# Patient Record
Sex: FEMALE | Race: WHITE
[De-identification: names, ages, dates, MRNs, and addresses within clinical notes are randomized per-mention and may not be internally consistent; named-entity substitution may affect disease eponyms.]

---

## 2020-11-07 ENCOUNTER — HOSPITAL ENCOUNTER (EMERGENCY)
Dept: HOSPITAL 75 - ER | Age: 25
Discharge: HOME | End: 2020-11-07
Payer: SELF-PAY

## 2020-11-07 VITALS — DIASTOLIC BLOOD PRESSURE: 99 MMHG | SYSTOLIC BLOOD PRESSURE: 186 MMHG

## 2020-11-07 VITALS — WEIGHT: 293 LBS | BODY MASS INDEX: 44.41 KG/M2 | HEIGHT: 67.99 IN

## 2020-11-07 DIAGNOSIS — E11.9: ICD-10-CM

## 2020-11-07 DIAGNOSIS — Z87.891: ICD-10-CM

## 2020-11-07 DIAGNOSIS — Z80.9: ICD-10-CM

## 2020-11-07 DIAGNOSIS — Z88.1: ICD-10-CM

## 2020-11-07 DIAGNOSIS — Z82.49: ICD-10-CM

## 2020-11-07 DIAGNOSIS — R10.32: Primary | ICD-10-CM

## 2020-11-07 DIAGNOSIS — Z79.84: ICD-10-CM

## 2020-11-07 DIAGNOSIS — Z83.3: ICD-10-CM

## 2020-11-07 DIAGNOSIS — N76.0: ICD-10-CM

## 2020-11-07 DIAGNOSIS — E66.9: ICD-10-CM

## 2020-11-07 LAB
ALBUMIN SERPL-MCNC: 4.1 GM/DL (ref 3.2–4.5)
ALP SERPL-CCNC: 44 U/L (ref 40–136)
ALT SERPL-CCNC: 59 U/L (ref 0–55)
APTT PPP: YELLOW S
BACTERIA #/AREA URNS HPF: (no result) /HPF
BASOPHILS # BLD AUTO: 0 10^3/UL (ref 0–0.1)
BASOPHILS NFR BLD AUTO: 1 % (ref 0–10)
BILIRUB SERPL-MCNC: 0.4 MG/DL (ref 0.1–1)
BILIRUB UR QL STRIP: NEGATIVE
BUN/CREAT SERPL: 15
CALCIUM SERPL-MCNC: 8.9 MG/DL (ref 8.5–10.1)
CHLORIDE SERPL-SCNC: 107 MMOL/L (ref 98–107)
CO2 SERPL-SCNC: 20 MMOL/L (ref 21–32)
CREAT SERPL-MCNC: 0.65 MG/DL (ref 0.6–1.3)
EOSINOPHIL # BLD AUTO: 0.2 10^3/UL (ref 0–0.3)
EOSINOPHIL NFR BLD AUTO: 3 % (ref 0–10)
FIBRINOGEN PPP-MCNC: CLEAR MG/DL
GFR SERPLBLD BASED ON 1.73 SQ M-ARVRAT: > 60 ML/MIN
GLUCOSE SERPL-MCNC: 103 MG/DL (ref 70–105)
GLUCOSE UR STRIP-MCNC: NEGATIVE MG/DL
HCT VFR BLD CALC: 43 % (ref 35–52)
HGB BLD-MCNC: 14.4 G/DL (ref 11.5–16)
KETONES UR QL STRIP: NEGATIVE
LEUKOCYTE ESTERASE UR QL STRIP: NEGATIVE
LYMPHOCYTES # BLD AUTO: 1.5 10^3/UL (ref 1–4)
LYMPHOCYTES NFR BLD AUTO: 27 % (ref 12–44)
MANUAL DIFFERENTIAL PERFORMED BLD QL: NO
MCH RBC QN AUTO: 29 PG (ref 25–34)
MCHC RBC AUTO-ENTMCNC: 34 G/DL (ref 32–36)
MCV RBC AUTO: 86 FL (ref 80–99)
MONOCYTES # BLD AUTO: 0.5 10^3/UL (ref 0–1)
MONOCYTES NFR BLD AUTO: 8 % (ref 0–12)
NEUTROPHILS # BLD AUTO: 3.5 10^3/UL (ref 1.8–7.8)
NEUTROPHILS NFR BLD AUTO: 61 % (ref 42–75)
NITRITE UR QL STRIP: NEGATIVE
PH UR STRIP: 6 [PH] (ref 5–9)
PLATELET # BLD: 209 10^3/UL (ref 130–400)
PMV BLD AUTO: 10.6 FL (ref 9–12.2)
POTASSIUM SERPL-SCNC: 4.4 MMOL/L (ref 3.6–5)
PROT SERPL-MCNC: 7.2 GM/DL (ref 6.4–8.2)
PROT UR QL STRIP: NEGATIVE
RBC #/AREA URNS HPF: (no result) /HPF
SODIUM SERPL-SCNC: 138 MMOL/L (ref 135–145)
SP GR UR STRIP: 1.02 (ref 1.02–1.02)
SQUAMOUS #/AREA URNS HPF: (no result) /HPF
WBC # BLD AUTO: 5.7 10^3/UL (ref 4.3–11)
WBC #/AREA URNS HPF: (no result) /HPF

## 2020-11-07 PROCEDURE — 85025 COMPLETE CBC W/AUTO DIFF WBC: CPT

## 2020-11-07 PROCEDURE — 86141 C-REACTIVE PROTEIN HS: CPT

## 2020-11-07 PROCEDURE — 87591 N.GONORRHOEAE DNA AMP PROB: CPT

## 2020-11-07 PROCEDURE — 36415 COLL VENOUS BLD VENIPUNCTURE: CPT

## 2020-11-07 PROCEDURE — 84703 CHORIONIC GONADOTROPIN ASSAY: CPT

## 2020-11-07 PROCEDURE — 87491 CHLMYD TRACH DNA AMP PROBE: CPT

## 2020-11-07 PROCEDURE — 87210 SMEAR WET MOUNT SALINE/INK: CPT

## 2020-11-07 PROCEDURE — 80053 COMPREHEN METABOLIC PANEL: CPT

## 2020-11-07 PROCEDURE — 74177 CT ABD & PELVIS W/CONTRAST: CPT

## 2020-11-07 PROCEDURE — 81000 URINALYSIS NONAUTO W/SCOPE: CPT

## 2020-11-07 PROCEDURE — 84702 CHORIONIC GONADOTROPIN TEST: CPT

## 2020-11-07 NOTE — NUR
ON ADMIT  PATIENT  STATES SHE WOULD KEEP IN CONTACT WITH .  JUST 
CALLED ASKING FOR UPDATE WENT TO ROOM  PATIENT REPORTED HER PHONE IS NOW DEAD 
AND CAN'T  CONACT HIM PHONE GIVEN TO CALL

## 2020-11-07 NOTE — NUR
PATIENT C/O IV BURNING  FLUSED WITHOUT PROBLEM AFTER FLUSED PATIENT REPORTS 
THAT IT IS NOT BURNING NOW.

## 2020-11-07 NOTE — DIAGNOSTIC IMAGING REPORT
CT ABDOMEN/PELVIS W



TECHNIQUE: Multiple contiguous axial images were obtained through

the abdomen and pelvis after administration of intravenous

contrast. All CT scans use one or more of the following dose

optimizing techniques: automated exposure control, MA and/or KvP

adjustment based on a patient size and exam type, or iterative

reconstruction. 



INDICATION: Lower abdominal pain. Pelvic discharge.



COMPARISON: CT abdomen and pelvis of 11/06/2016



FINDINGS:



Lower chest: The lung bases are clear. No pericardial or pleural

effusion. 



Peritoneum: No free intraperitoneal air or fluid.  



Liver and biliary system: Diffuse hypoattenuation liver suggests

hepatic steatosis. No focal hepatic lesion. The gallbladder is

normal. No biliary duct dilation. 



Spleen and Pancreas: Spleen is normal.  The pancreas enhances

normally without mass lesion or peripancreatic inflammatory

changes. 



Adrenals: Normal.



 tract: The kidneys enhance normally without suspicious mass or

obstruction. Urinary bladder is distended without wall

thickening.  Uterus and ovaries are physiologic in appearance. No

dilation of the fallopian tubes suggest pelvic inflammatory

disease.



GI tract: Stomach is decompressed.  No bowel obstruction.  No

pericolonic inflammatory changes.  Normal appendix.



Vasculature and Lymph nodes: Normal caliber aorta. No abdominal

or pelvic lymphadenopathy.



Musculoskeletal: No concerning osseous lesion. 



IMPRESSION: 

1. No acute inflammatory or obstructive process.

2. Mild hepatic steatosis.



Dictated by: 



  Dictated on workstation # [id number]

## 2020-11-07 NOTE — ED ABDOMINAL PAIN
General


Stated Complaint:  ABD PAIN


Source of Information:  Patient


Exam Limitations:  No Limitations





History of Present Illness


Date Seen by Provider:  2020


Time Seen by Provider:  08:55


Initial Comments


Here with report of lower abdominal pain and cramping to the back. She woke up 

with a gush of fluid this morning. States that she's had the pain intermittently

over the last 2 days. She has not taken anything for that. She has not had a 

menstrual period since January. She has taken home pregnancy test but those have

been negative. She is sexually active with her . Denies blood in her 

urine or stool. States fluid was clear. Denies fever, chills, sore throat, runny

nose, contact COVID-19 or other concerns. Pain is worse on the left lower and 

centrally and radiates to her back.


Timing/Duration:  1-2 Days


Severity/Quality:  Moderate, Aching, Cramping


Location:  LLQ, Suprapubic


Radiation:  Back


Activities at Onset:  None


Modifying Factors:  Improves With Resting


Associated Symptoms:  Back Pain; No Fever/Chills, No Nausea/Vomiting, No 

Shortness of Air, No Swelling/Mass in Abdomen, No Weakness





Allergies and Home Medications


Allergies


Coded Allergies:  


     amoxicillin (Verified  Allergy, Unknown, 16)


     clavulanic acid (Verified  Allergy, Unknown, 16)





Home Medications


Ketorolac Tromethamine 10 Mg Tablet, 10 MG PO Q6H


   Prescribed by: LILY KHALIL on 16


Levofloxacin 500 Mg Tablet, 500 MG PO DAILY


   Prescribed by: LILY KHALIL on 16


Metformin Hcl 500 Mg Tablet, 1 EACH PO BID WITH MEALS, (Reported)


Ondansetron 4 Mg Tab.rapdis, 4 MG PO Q4H


   Prescribed by: LILY KHALIL on 16





Patient Home Medication List


Home Medication List Reviewed:  Yes





Review of Systems


Review of Systems


Constitutional:  see HPI


EENTM:  No Symptoms Reported, See HPI


Respiratory:  Denies Cough, Denies Shortness of Air


Cardiovascular:  Denies Chest Pain, Denies Edema


Gastrointestinal:  See HPI


Genitourinary:  Denies Flank Pain, Denies Hematuria, Denies Pain


Musculoskeletal:  see HPI; No muscle pain





All Other Systems Reviewed


Negative Unless Noted:  Yes





Past Medical-Social-Family Hx


Past Med/Social Hx:  Reviewed Nursing Past Med/Soc Hx


Patient Social History


Alcohol Use:  Denies Use


Recreational Drug Use:  No


Smoking Status:  Former Smoker


Type Used:  Cigarettes


Recent Foreign Travel:  No


Contact w/Someone Who Travel:  No


Recent Hopitalizations:  No





Past Medical History


Surgeries:  Yes


Orthopedic


Respiratory:  No


Cardiac:  No


Neurological:  No


Pregnant:  No


Reproductive Disorders:  Yes


Female Reproductive Disorders:  Menstrual Problems


Genitourinary:  No


Gastrointestinal:  No


Musculoskeletal:  Yes


Fractures


Endocrine:  Yes


Diabetes, Non-Insulin dep





Family Medical History


Reviewed Nursing Family Hx


Cancer, Diabetes, Hypertension





Physical Exam


Vital Signs





Vital Signs - First Documented








 20





 08:54


 


Temp 36.1


 


Pulse 111


 


Resp 18


 


B/P (MAP) 178/117 (137)


 


Pulse Ox 97


 


O2 Delivery Room Air





Capillary Refill :


Height/Weight/BMI


Height: 5'8"


Weight: 320lbs. oz. 145.731776du;  BMI


Method:Stated


General Appearance:  WD/WN, no apparent distress, obese


HEENT:  PERRL/EOMI, pharynx normal


Neck:  full range of motion, supple


Respiratory:  lungs clear, normal breath sounds


Cardiovascular:  regular rate, rhythm, no murmur


Peripheral Pulses:  2+ Dorsalis Pedis (R), 2+ Left Dors-Pedis (L), 2+ Radial 

Pulses (R), 2+ Radial Pulses (L)


Gastrointestinal:  soft, tenderness (left lower quadrant)


Extremities:  non-tender, normal inspection


Back:  normal inspection, no CVA tenderness, no vertebral tenderness


Neurologic/Psychiatric:  alert, oriented x 3


Skin:  normal color, warm/dry





Progress/Results/Core Measures


Results/Orders


Lab Results





Laboratory Tests








Test


 20


09:04 20


09:15 Range/Units


 


 


Urine Color YELLOW    


 


Urine Clarity CLEAR    


 


Urine pH 6.0   5-9  


 


Urine Specific Gravity 1.025 H  1.016-1.022  


 


Urine Protein NEGATIVE   NEGATIVE  


 


Urine Glucose (UA) NEGATIVE   NEGATIVE  


 


Urine Ketones NEGATIVE   NEGATIVE  


 


Urine Nitrite NEGATIVE   NEGATIVE  


 


Urine Bilirubin NEGATIVE   NEGATIVE  


 


Urine Urobilinogen 0.2   < = 1.0  MG/DL


 


Urine Leukocyte Esterase NEGATIVE   NEGATIVE  


 


Urine RBC (Auto) NEGATIVE   NEGATIVE  


 


Urine RBC NONE    /HPF


 


Urine WBC 0-2    /HPF


 


Urine Squamous Epithelial


Cells 5-10 


 


  /HPF





 


Urine Crystals NONE    /LPF


 


Urine Bacteria FEW H   /HPF


 


Urine Casts NONE    /LPF


 


Urine Mucus NEGATIVE    /LPF


 


Urine Culture Indicated NO    


 


White Blood Count


 


 5.7 


 4.3-11.0


10^3/uL


 


Red Blood Count


 


 5.01 


 3.80-5.11


10^6/uL


 


Hemoglobin  14.4  11.5-16.0  g/dL


 


Hematocrit  43  35-52  %


 


Mean Corpuscular Volume  86  80-99  fL


 


Mean Corpuscular Hemoglobin  29  25-34  pg


 


Mean Corpuscular Hemoglobin


Concent 


 34 


 32-36  g/dL





 


Red Cell Distribution Width  13.7  10.0-14.5  %


 


Platelet Count


 


 209 


 130-400


10^3/uL


 


Mean Platelet Volume  10.6  9.0-12.2  fL


 


Immature Granulocyte % (Auto)  0   %


 


Neutrophils (%) (Auto)  61  42-75  %


 


Lymphocytes (%) (Auto)  27  12-44  %


 


Monocytes (%) (Auto)  8  0-12  %


 


Eosinophils (%) (Auto)  3  0-10  %


 


Basophils (%) (Auto)  1  0-10  %


 


Neutrophils # (Auto)


 


 3.5 


 1.8-7.8


10^3/uL


 


Lymphocytes # (Auto)


 


 1.5 


 1.0-4.0


10^3/uL


 


Monocytes # (Auto)


 


 0.5 


 0.0-1.0


10^3/uL


 


Eosinophils # (Auto)


 


 0.2 


 0.0-0.3


10^3/uL


 


Basophils # (Auto)


 


 0.0 


 0.0-0.1


10^3/uL


 


Immature Granulocyte # (Auto)


 


 0.0 


 0.0-0.1


10^3/uL


 


Sodium Level  138  135-145  MMOL/L


 


Potassium Level  4.4  3.6-5.0  MMOL/L


 


Chloride Level  107    MMOL/L


 


Carbon Dioxide Level  20 L 21-32  MMOL/L


 


Anion Gap  11  5-14  MMOL/L


 


Blood Urea Nitrogen  10  7-18  MG/DL


 


Creatinine


 


 0.65 


 0.60-1.30


MG/DL


 


Estimat Glomerular Filtration


Rate 


 > 60 


  





 


BUN/Creatinine Ratio  15   


 


Glucose Level  103    MG/DL


 


Calcium Level  8.9  8.5-10.1  MG/DL


 


Corrected Calcium  8.8  8.5-10.1  MG/DL


 


Total Bilirubin  0.4  0.1-1.0  MG/DL


 


Aspartate Amino Transf


(AST/SGOT) 


 29 


 5-34  U/L





 


Alanine Aminotransferase


(ALT/SGPT) 


 59 H


 0-55  U/L





 


Alkaline Phosphatase  44    U/L


 


C-Reactive Protein High


Sensitivity 


 0.87 H


 0.00-0.50


MG/DL


 


Total Protein  7.2  6.4-8.2  GM/DL


 


Albumin  4.1  3.2-4.5  GM/DL


 


Human Chorionic Gonadotropin,


Quant 


 < 5 


 <5  MIU/ML











Micro Results





Microbiology


20 Wet Prep - Final, Complete


          





My Orders





Orders - MITRA BOO MD


Cbc With Automated Diff (20 09:06)


Comprehensive Metabolic Panel (20 09:06)


Hs C Reactive Protein (20 09:06)


Ua Culture If Indicated (20 09:06)


Ed Iv/Invasive Line Start (20 09:06)


Lactated Ringers (Lr 1000 Ml Iv Solution (20 09:06)


Urine Pregnancy Bedside (20 09:06)


Hcg,Quantitative (20 09:42)


Ct Abdomen/Pelvis W (20 10:14)


Iohexol Injection (Omnipaque 350 Mg/Ml 1 (20 10:30)


Received Contrast (Hold Metformin- Contr (20 10:30)


Ns (Ivpb) (Sodium Chloride 0.9% Ivpb Bag (20 10:30)


Neis Mitul Dna Urine Test (20 11:08)


Chlamydia Trachomatis Urine (20 11:08)


Wet Prep (20 11:08)


Ketorolac Injection (Toradol Injection) (20 11:46)





Medications Given in ED





Current Medications








 Medications  Dose


 Ordered  Sig/Cynthia


 Route  Start Time


 Stop Time Status Last Admin


Dose Admin


 


 Iohexol  100 ml  ONCE  ONCE


 IV  20 10:30


 20 10:31 DC 20 10:36


100 ML


 


 Lactated Ringer's  1,000 ml @ 


 0 mls/hr  Q0M ONCE


 IV  20 09:06


 20 09:07 DC 20 09:22


1,000 MLS/HR


 


 Sodium Chloride  100 ml  ONCE  ONCE


 IV  20 10:30


 20 10:31 DC 20 10:37


80 ML








Vital Signs/I&O











 11/7/20





 08:54


 


Temp 36.1


 


Pulse 111


 


Resp 18


 


B/P (MAP) 178/117 (137)


 


Pulse Ox 97


 


O2 Delivery Room Air











Progress


Progress Note :  


Progress Note


Seen and evaluated. Bedside ultrasound performed and no obvious advanced 

pregnancy. IV, labs, UA, UCG bedside testing and LR 1 L bolus ordered. Bedside 

testing is also negative. We will check labs and anticipate CT abdomen and 

pelvis with contrast for the left lower quadrant abdominal pain. Monitor 

patient. 1120: CT complete and does not show any concerning findings. Toradol 30

mg IV for pain. We will do GC and chlamydia via urine and get wet prep for 

concerns for bacterial vaginosis as a possible cause. Monitor patient. 1152: Wet

prep is positive for clue cells and white cells. We will initiate outpatient 

metronidazole. Discharged home with return precautions. Patient verbalize 

understanding instructions and agreement with plan.





Diagnostic Imaging





   Diagonstic Imaging:  CT


   Plain Films/CT/US/NM/MRI:  abdomen, pelvis


Comments


                 [facility] [place], [place]





NAME:   SUMAN POP


[facility] REC#:   [id number]


ACCOUNT#:   [id number]


PT STATUS:   REG ER


:   1995


PHYSICIAN:   MITRA BOO MD


ADMIT DATE:   20/ER


                                   ***Draft***


Date of Exam:20





CT ABDOMEN/PELVIS W








CT ABDOMEN/PELVIS W





TECHNIQUE: Multiple contiguous axial images were obtained through


the abdomen and pelvis after administration of intravenous


contrast. All CT scans use one or more of the following dose


optimizing techniques: automated exposure control, MA and/or KvP


adjustment based on a patient size and exam type, or iterative


reconstruction. 





INDICATION: Lower abdominal pain. Pelvic discharge.





COMPARISON: CT abdomen and pelvis of 2016





FINDINGS:





Lower chest: The lung bases are clear. No pericardial or pleural


effusion. 





Peritoneum: No free intraperitoneal air or fluid.  





Liver and biliary system: Diffuse hypoattenuation liver suggests


hepatic steatosis. No focal hepatic lesion. The gallbladder is


normal. No biliary duct dilation. 





Spleen and Pancreas: Spleen is normal.  The pancreas enhances


normally without mass lesion or peripancreatic inflammatory


changes. 





Adrenals: Normal.





 tract: The kidneys enhance normally without suspicious mass or


obstruction. Urinary bladder is distended without wall


thickening.  Uterus and ovaries are physiologic in appearance. No


dilation of the fallopian tubes suggest pelvic inflammatory


disease.





GI tract: Stomach is decompressed.  No bowel obstruction.  No


pericolonic inflammatory changes.  Normal appendix.





Vasculature and Lymph nodes: Normal caliber aorta. No abdominal


or pelvic lymphadenopathy.





Musculoskeletal: No concerning osseous lesion. 





IMPRESSION: 


1. No acute inflammatory or obstructive process.


2. Mild hepatic steatosis.





  Dictated on workstation # [id number]








Dict:   20 1051


Trans:   20 1055


 [id number]





Interpreted by:     KENYETTA RAMEY MD


Electronically signed by:





Departure


Impression





   Primary Impression:  


   Lower abdominal pain


   Additional Impression:  


   Bacterial vaginosis


Disposition:   HOME, SELF-CARE


Condition:  Stable





Departure-Patient Inst.


Decision time for Depature:  11:53


Referrals:  


NO,LOCAL PHYSICIAN (PCP/Family)


Primary Care Physician


Patient Instructions:  Severe Abdominal Pain, Adult (DC), Bacterial Vaginosis 

(DC)





Add. Discharge Instructions:  


You may take ibuprofen 800 mg every 8 hours as needed for pain. You may also 

take Tylenol/acetaminophen 1000 mg every 8 hours as needed for pain.Take 

medications as directed. Follow-up with your Dr. in a few days for recheck. Retu

rn for worse pain, fever, vomiting, weakness, breathing problems or other 

concerns as needed. Do not drink alcohol while taking the metronidazole (Flagyl)

as this will cause you to be nauseated and or vomit.


Scripts


Metronidazole (Metronidazole) 500 Mg Tablet


500 MG PO BID, #14 TAB 0 Refills


   Prov: MITRA BOO MD         20











MITRA BOO MD           2020 09:15